# Patient Record
(demographics unavailable — no encounter records)

---

## 2025-02-18 NOTE — HEALTH RISK ASSESSMENT
[Very Good] : ~his/her~  mood as very good [No falls in past year] : Patient reported no falls in the past year [0] : 2) Feeling down, depressed, or hopeless: Not at all (0) [PHQ-2 Negative - No further assessment needed] : PHQ-2 Negative - No further assessment needed [Fully functional (bathing, dressing, toileting, transferring, walking, feeding)] : Fully functional (bathing, dressing, toileting, transferring, walking, feeding) [Fully functional (using the telephone, shopping, preparing meals, housekeeping, doing laundry, using] : Fully functional and needs no help or supervision to perform IADLs (using the telephone, shopping, preparing meals, housekeeping, doing laundry, using transportation, managing medications and managing finances) [Reports normal functional visual acuity (ie: able to read med bottle)] : Reports normal functional visual acuity [No] : No [] :  [de-identified] : to join a gym [de-identified] : healthy diet [[id number]] : 0 [Reports changes in vision] : Reports no changes in vision [Reports changes in dental health] : Reports no changes in dental health [MammogramDate] : 10/24 [MammogramComments] : gyn = group in [place] [PapSmearDate] : 10/24 [ColonoscopyDate] : 02/24

## 2025-02-18 NOTE — ASSESSMENT
[FreeTextEntry1] : LBP - referral to PT  Sinus Tach - in past,  HR always 60s- 70s No symptoms (no CP or SOB); Exam otherwise normal Can see cardiology   DM - A1C under control on Ozempic May switch to another GLP-1 with her new endocrinologist to see if she can get weight loss as well as glycemic control To try to keep weight under control with diet and exercise  hcm DEXA Prevnar 20 today

## 2025-02-18 NOTE — REVIEW OF SYSTEMS
[Negative] : Respiratory [Chest Pain] : no chest pain [Palpitations] : no palpitations [Lower Ext Edema] : no lower extremity edema [Shortness Of Breath] : no shortness of breath

## 2025-02-18 NOTE — HISTORY OF PRESENT ILLNESS
[FreeTextEntry1] : Here for CPE [de-identified] : Stopped metformin - loose stools gone! Now on Ozempic - A1C 6.1; but not losing weight; asks about switching to another GLP-1 Endocrine - to change to another endocrinologist in [facility]  Complains of several months of recurrent low back pain; no radiation; no specific trauma

## 2025-02-18 NOTE — COUNSELING
[Strategies to reduce or eliminate alcohol use discussed] : Strategies to reduce or eliminate alcohol use discussed [Encouraged to increase physical activity] : Encouraged to increase physical activity